# Patient Record
Sex: FEMALE | Race: OTHER | Employment: UNEMPLOYED | URBAN - METROPOLITAN AREA
[De-identification: names, ages, dates, MRNs, and addresses within clinical notes are randomized per-mention and may not be internally consistent; named-entity substitution may affect disease eponyms.]

---

## 2017-08-18 ENCOUNTER — HOSPITAL ENCOUNTER (EMERGENCY)
Age: 32
Discharge: HOME | End: 2017-08-18
Attending: EMERGENCY MEDICINE
Payer: SELF-PAY

## 2017-08-18 ENCOUNTER — APPOINTMENT (OUTPATIENT)
Dept: ULTRASOUND IMAGING | Age: 32
End: 2017-08-18
Attending: EMERGENCY MEDICINE
Payer: SELF-PAY

## 2017-08-18 VITALS
HEART RATE: 76 BPM | SYSTOLIC BLOOD PRESSURE: 109 MMHG | DIASTOLIC BLOOD PRESSURE: 76 MMHG | OXYGEN SATURATION: 100 % | BODY MASS INDEX: 20.24 KG/M2 | RESPIRATION RATE: 12 BRPM | HEIGHT: 62 IN | TEMPERATURE: 98.4 F | WEIGHT: 110 LBS

## 2017-08-18 DIAGNOSIS — O20.0 THREATENED MISCARRIAGE IN EARLY PREGNANCY: Primary | ICD-10-CM

## 2017-08-18 LAB
ABO + RH BLD: NORMAL
ANION GAP SERPL CALC-SCNC: 7 MMOL/L (ref 3–18)
APPEARANCE UR: CLEAR
BACTERIA URNS QL MICRO: ABNORMAL /HPF
BASOPHILS # BLD: 0 K/UL (ref 0–0.06)
BASOPHILS NFR BLD: 0 % (ref 0–2)
BILIRUB UR QL: NEGATIVE
BUN SERPL-MCNC: 17 MG/DL (ref 7–18)
BUN/CREAT SERPL: 27 (ref 12–20)
CALCIUM SERPL-MCNC: 8.9 MG/DL (ref 8.5–10.1)
CHLORIDE SERPL-SCNC: 103 MMOL/L (ref 100–108)
CO2 SERPL-SCNC: 28 MMOL/L (ref 21–32)
COLOR UR: YELLOW
CREAT SERPL-MCNC: 0.63 MG/DL (ref 0.6–1.3)
DIFFERENTIAL METHOD BLD: ABNORMAL
EOSINOPHIL # BLD: 0 K/UL (ref 0–0.4)
EOSINOPHIL NFR BLD: 0 % (ref 0–5)
EPITH CASTS URNS QL MICRO: ABNORMAL /LPF (ref 0–5)
ERYTHROCYTE [DISTWIDTH] IN BLOOD BY AUTOMATED COUNT: 12.1 % (ref 11.6–14.5)
GLUCOSE SERPL-MCNC: 93 MG/DL (ref 74–99)
GLUCOSE UR STRIP.AUTO-MCNC: NEGATIVE MG/DL
HCG SERPL-ACNC: 402 MIU/ML (ref 0–10)
HCG UR QL: POSITIVE
HCT VFR BLD AUTO: 36.3 % (ref 35–45)
HGB BLD-MCNC: 12.5 G/DL (ref 12–16)
HGB UR QL STRIP: ABNORMAL
KETONES UR QL STRIP.AUTO: NEGATIVE MG/DL
LEUKOCYTE ESTERASE UR QL STRIP.AUTO: ABNORMAL
LYMPHOCYTES # BLD: 1.6 K/UL (ref 0.9–3.6)
LYMPHOCYTES NFR BLD: 27 % (ref 21–52)
MCH RBC QN AUTO: 28.3 PG (ref 24–34)
MCHC RBC AUTO-ENTMCNC: 34.4 G/DL (ref 31–37)
MCV RBC AUTO: 82.3 FL (ref 74–97)
MONOCYTES # BLD: 0.8 K/UL (ref 0.05–1.2)
MONOCYTES NFR BLD: 14 % (ref 3–10)
NEUTS SEG # BLD: 3.4 K/UL (ref 1.8–8)
NEUTS SEG NFR BLD: 59 % (ref 40–73)
NITRITE UR QL STRIP.AUTO: NEGATIVE
PH UR STRIP: 7.5 [PH] (ref 5–8)
PLATELET # BLD AUTO: 314 K/UL (ref 135–420)
PMV BLD AUTO: 9 FL (ref 9.2–11.8)
POTASSIUM SERPL-SCNC: 3.9 MMOL/L (ref 3.5–5.5)
PROT UR STRIP-MCNC: NEGATIVE MG/DL
RBC # BLD AUTO: 4.41 M/UL (ref 4.2–5.3)
RBC #/AREA URNS HPF: ABNORMAL /HPF (ref 0–5)
SERVICE CMNT-IMP: NORMAL
SODIUM SERPL-SCNC: 138 MMOL/L (ref 136–145)
SP GR UR REFRACTOMETRY: 1.01 (ref 1–1.03)
UROBILINOGEN UR QL STRIP.AUTO: 0.2 EU/DL (ref 0.2–1)
WBC # BLD AUTO: 5.8 K/UL (ref 4.6–13.2)
WBC URNS QL MICRO: ABNORMAL /HPF (ref 0–4)
WET PREP GENITAL: NORMAL

## 2017-08-18 PROCEDURE — 86900 BLOOD TYPING SEROLOGIC ABO: CPT | Performed by: EMERGENCY MEDICINE

## 2017-08-18 PROCEDURE — 76817 TRANSVAGINAL US OBSTETRIC: CPT

## 2017-08-18 PROCEDURE — 81001 URINALYSIS AUTO W/SCOPE: CPT

## 2017-08-18 PROCEDURE — 99284 EMERGENCY DEPT VISIT MOD MDM: CPT

## 2017-08-18 PROCEDURE — 80048 BASIC METABOLIC PNL TOTAL CA: CPT | Performed by: EMERGENCY MEDICINE

## 2017-08-18 PROCEDURE — 81025 URINE PREGNANCY TEST: CPT

## 2017-08-18 PROCEDURE — 87491 CHLMYD TRACH DNA AMP PROBE: CPT

## 2017-08-18 PROCEDURE — 85025 COMPLETE CBC W/AUTO DIFF WBC: CPT | Performed by: EMERGENCY MEDICINE

## 2017-08-18 PROCEDURE — 87210 SMEAR WET MOUNT SALINE/INK: CPT

## 2017-08-18 PROCEDURE — 84702 CHORIONIC GONADOTROPIN TEST: CPT | Performed by: EMERGENCY MEDICINE

## 2017-08-18 NOTE — ED TRIAGE NOTES
Provider in triage: 28 y o f 6 wks preg has vaginal bleeding for four days with lower abd pain  Ordered: labs, t&s Patria  DDx: threatened ab, ectopic  Sent to (MAIN treatment area, FAST track): fast

## 2017-08-18 NOTE — ED PROVIDER NOTES
HPI Comments: Patient is a 27 y/o female , who presents to the ER c/o vaginal spotting. Patient states her symptoms began 2-3 days ago. Patient states she is approx. 6 weeks pregnant with confirmed home pregnancy test.  Her LMP was . She has been taking OTC prenatals. She does not have any OBGYN follow up. She denied any fevers, chills, chest pain, SOB, abdominal pain, dysuria, and has no other complaints. Pt denied any concern for STD exposure at this time. Patient is a 28 y.o. female presenting with vaginal bleeding. The history is provided by the patient. Vaginal Bleeding   Pertinent negatives include no chest pain, no abdominal pain, no headaches and no shortness of breath. No past medical history on file. No past surgical history on file. No family history on file. Social History     Social History    Marital status: SINGLE     Spouse name: N/A    Number of children: N/A    Years of education: N/A     Occupational History    Not on file. Social History Main Topics    Smoking status: Not on file    Smokeless tobacco: Not on file    Alcohol use Not on file    Drug use: Not on file    Sexual activity: Not on file     Other Topics Concern    Not on file     Social History Narrative         ALLERGIES: Review of patient's allergies indicates no known allergies. Review of Systems   Constitutional: Negative for chills, fatigue and fever. HENT: Negative. Negative for sore throat. Eyes: Negative. Respiratory: Negative for cough and shortness of breath. Cardiovascular: Negative for chest pain and palpitations. Gastrointestinal: Negative for abdominal pain, nausea and vomiting. Genitourinary: Positive for vaginal bleeding. Negative for dysuria. Musculoskeletal: Negative. Skin: Negative. Neurological: Negative for dizziness, weakness, light-headedness and headaches. Psychiatric/Behavioral: Negative.     All other systems reviewed and are negative. Vitals:    17 1715   BP: 109/76   Pulse: 76   Resp: 12   Temp: 98.4 °F (36.9 °C)   SpO2: 100%   Weight: 49.9 kg (110 lb)   Height: 5' 2\" (1.575 m)            Physical Exam   Constitutional: She is oriented to person, place, and time. She appears well-developed and well-nourished. No distress. HENT:   Head: Normocephalic and atraumatic. Mouth/Throat: Oropharynx is clear and moist.   Eyes: Conjunctivae are normal. No scleral icterus. Neck: Normal range of motion. Neck supple. No JVD present. No tracheal deviation present. Cardiovascular: Normal rate, regular rhythm and normal heart sounds. Pulmonary/Chest: Effort normal and breath sounds normal. No respiratory distress. She has no wheezes. Abdominal: Soft. Bowel sounds are normal. There is no tenderness. Musculoskeletal: Normal range of motion. Neurological: She is alert and oriented to person, place, and time. She has normal strength. Gait normal. GCS eye subscore is 4. GCS verbal subscore is 5. GCS motor subscore is 6. Skin: Skin is warm and dry. She is not diaphoretic. Psychiatric: She has a normal mood and affect. Nursing note and vitals reviewed. MDM  Number of Diagnoses or Management Options  Threatened miscarriage in early pregnancy:   Diagnosis management comments: 5:50 PM  27 y/o female , approx. 6 weeks pregnant c/o vaginal spotting onset several days ago. Does not have OBGYN follow up. Is already taking prenatals at this time. Will plan on labs, UA, and TVUS. Bekah Frey PA-C    8:51 PM  Awaiting wet prep results. TVUS results :  Pregnancy of unknown location; no IUP, no separate adnexal masses. Findings possible due to early pregnancy. Advised f/u US and Beta HCG until definitive diagnosis. Bekah Frey PA-C    9:29 PM  Wet prep negative. Discussed US results with pt and boyfriend in room. Advised to return in 3-5 days for follow up repeat beta HCG to trend.   Will also give OBGYN info for follow up. All questions answered and patient in agreement with plan of care. Will plan for discharge. Mendel Hemp, PA-C      Clinical Impression:  Positive  Pregnancy, vaginal bleeding       Amount and/or Complexity of Data Reviewed  Clinical lab tests: ordered and reviewed  Tests in the radiology section of CPT®: ordered and reviewed    Risk of Complications, Morbidity, and/or Mortality  Presenting problems: moderate  Diagnostic procedures: moderate  Management options: moderate    Patient Progress  Patient progress: stable    ED Course       Pelvic Exam  Date/Time: 8/18/2017 8:49 PM  Performed by: PA  Procedure duration:  2 minutes. Documented by:  katarzyna weiner. As dictated by:  same  Exam assisted by:  Lamar Parham RN. Type of exam performed: bimanual and speculum. External genitalia appearance: normal.    Vaginal exam:  bleeding. Bleeding: mild  Cervical exam:  os closed and no cervical motion tenderness. Specimen(s) collected:  chlamydia, GC and vaginal culture.   Bimanual exam:  normal.    Patient tolerance: Patient tolerated the procedure well with no immediate complications                 Vitals:  Patient Vitals for the past 12 hrs:   Temp Pulse Resp BP SpO2   08/18/17 1715 98.4 °F (36.9 °C) 76 12 109/76 100 %         Medications ordered:   Medications - No data to display      Lab findings:  Recent Results (from the past 12 hour(s))   URINALYSIS W/ RFLX MICROSCOPIC    Collection Time: 08/18/17  5:37 PM   Result Value Ref Range    Color YELLOW      Appearance CLEAR      Specific gravity 1.015 1.005 - 1.030      pH (UA) 7.5 5.0 - 8.0      Protein NEGATIVE  NEG mg/dL    Glucose NEGATIVE  NEG mg/dL    Ketone NEGATIVE  NEG mg/dL    Bilirubin NEGATIVE  NEG      Blood MODERATE (A) NEG      Urobilinogen 0.2 0.2 - 1.0 EU/dL    Nitrites NEGATIVE  NEG      Leukocyte Esterase TRACE (A) NEG     URINE MICROSCOPIC ONLY    Collection Time: 08/18/17  5:37 PM   Result Value Ref Range    WBC 2 to 3 0 - 4 /hpf    RBC 4 to 8 0 - 5 /hpf    Epithelial cells 2+ 0 - 5 /lpf    Bacteria FEW (A) NEG /hpf   CBC WITH AUTOMATED DIFF    Collection Time: 08/18/17  6:09 PM   Result Value Ref Range    WBC 5.8 4.6 - 13.2 K/uL    RBC 4.41 4.20 - 5.30 M/uL    HGB 12.5 12.0 - 16.0 g/dL    HCT 36.3 35.0 - 45.0 %    MCV 82.3 74.0 - 97.0 FL    MCH 28.3 24.0 - 34.0 PG    MCHC 34.4 31.0 - 37.0 g/dL    RDW 12.1 11.6 - 14.5 %    PLATELET 475 867 - 534 K/uL    MPV 9.0 (L) 9.2 - 11.8 FL    NEUTROPHILS 59 40 - 73 %    LYMPHOCYTES 27 21 - 52 %    MONOCYTES 14 (H) 3 - 10 %    EOSINOPHILS 0 0 - 5 %    BASOPHILS 0 0 - 2 %    ABS. NEUTROPHILS 3.4 1.8 - 8.0 K/UL    ABS. LYMPHOCYTES 1.6 0.9 - 3.6 K/UL    ABS. MONOCYTES 0.8 0.05 - 1.2 K/UL    ABS. EOSINOPHILS 0.0 0.0 - 0.4 K/UL    ABS.  BASOPHILS 0.0 0.0 - 0.06 K/UL    DF AUTOMATED     METABOLIC PANEL, BASIC    Collection Time: 08/18/17  6:09 PM   Result Value Ref Range    Sodium 138 136 - 145 mmol/L    Potassium 3.9 3.5 - 5.5 mmol/L    Chloride 103 100 - 108 mmol/L    CO2 28 21 - 32 mmol/L    Anion gap 7 3.0 - 18 mmol/L    Glucose 93 74 - 99 mg/dL    BUN 17 7.0 - 18 MG/DL    Creatinine 0.63 0.6 - 1.3 MG/DL    BUN/Creatinine ratio 27 (H) 12 - 20      GFR est AA >60 >60 ml/min/1.73m2    GFR est non-AA >60 >60 ml/min/1.73m2    Calcium 8.9 8.5 - 10.1 MG/DL   TYPE, ABO & RH    Collection Time: 08/18/17  6:09 PM   Result Value Ref Range    ABO/Rh(D) O POSITIVE    BETA HCG, QT    Collection Time: 08/18/17  6:09 PM   Result Value Ref Range    Beta HCG,  (H) 0 - 10 MIU/ML   HCG URINE, QL. - POC    Collection Time: 08/18/17  6:16 PM   Result Value Ref Range    Pregnancy test,urine (POC) POSITIVE (A) NEG     WET PREP    Collection Time: 08/18/17  8:46 PM   Result Value Ref Range    Special Requests: NO SPECIAL REQUESTS      Wet prep NO YEAST,TRICHOMONAS OR CLUE CELLS NOTED         EKG interpretation by ED Physician:      X-Ray, CT or other radiology findings or impressions:  US UTS TRANSVAGINAL OB (Results Pending)       Progress notes, Consult notes or additional Procedure notes:       Reevaluation of patient:       Disposition:  Diagnosis:   1.  Threatened miscarriage in early pregnancy        Disposition: Discharged    Follow-up Information     Follow up With Details Comments Contact Info    [facility] EMERGENCY DEPT  If symptoms worsen [address]  [phone]    [facility] EMERGENCY DEPT In 1 week for follow up of labs and ultrasound as needed  [address] [address] Schedule an appointment as soon as possible for a visit in 3 days follow up for OBGYN as needed [address]  [phone]           Patient's Medications    No medications on file           Vitals:  Patient Vitals for the past 12 hrs:   Temp Pulse Resp BP SpO2   08/18/17 1715 98.4 °F (36.9 °C) 76 12 109/76 100 %         Medications ordered:   Medications - No data to display      Lab findings:  Recent Results (from the past 12 hour(s))   URINALYSIS W/ RFLX MICROSCOPIC    Collection Time: 08/18/17  5:37 PM   Result Value Ref Range    Color YELLOW      Appearance CLEAR      Specific gravity 1.015 1.005 - 1.030      pH (UA) 7.5 5.0 - 8.0      Protein NEGATIVE  NEG mg/dL    Glucose NEGATIVE  NEG mg/dL    Ketone NEGATIVE  NEG mg/dL    Bilirubin NEGATIVE  NEG      Blood MODERATE (A) NEG      Urobilinogen 0.2 0.2 - 1.0 EU/dL    Nitrites NEGATIVE  NEG      Leukocyte Esterase TRACE (A) NEG     URINE MICROSCOPIC ONLY    Collection Time: 08/18/17  5:37 PM   Result Value Ref Range    WBC 2 to 3 0 - 4 /hpf    RBC 4 to 8 0 - 5 /hpf    Epithelial cells 2+ 0 - 5 /lpf    Bacteria FEW (A) NEG /hpf   CBC WITH AUTOMATED DIFF    Collection Time: 08/18/17  6:09 PM   Result Value Ref Range    WBC 5.8 4.6 - 13.2 K/uL    RBC 4.41 4.20 - 5.30 M/uL    HGB 12.5 12.0 - 16.0 g/dL    HCT 36.3 35.0 - 45.0 %    MCV 82.3 74.0 - 97.0 FL    MCH 28.3 24.0 - 34.0 PG MCHC 34.4 31.0 - 37.0 g/dL    RDW 12.1 11.6 - 14.5 %    PLATELET 312 070 - 342 K/uL    MPV 9.0 (L) 9.2 - 11.8 FL    NEUTROPHILS 59 40 - 73 %    LYMPHOCYTES 27 21 - 52 %    MONOCYTES 14 (H) 3 - 10 %    EOSINOPHILS 0 0 - 5 %    BASOPHILS 0 0 - 2 %    ABS. NEUTROPHILS 3.4 1.8 - 8.0 K/UL    ABS. LYMPHOCYTES 1.6 0.9 - 3.6 K/UL    ABS. MONOCYTES 0.8 0.05 - 1.2 K/UL    ABS. EOSINOPHILS 0.0 0.0 - 0.4 K/UL    ABS. BASOPHILS 0.0 0.0 - 0.06 K/UL    DF AUTOMATED     METABOLIC PANEL, BASIC    Collection Time: 08/18/17  6:09 PM   Result Value Ref Range    Sodium 138 136 - 145 mmol/L    Potassium 3.9 3.5 - 5.5 mmol/L    Chloride 103 100 - 108 mmol/L    CO2 28 21 - 32 mmol/L    Anion gap 7 3.0 - 18 mmol/L    Glucose 93 74 - 99 mg/dL    BUN 17 7.0 - 18 MG/DL    Creatinine 0.63 0.6 - 1.3 MG/DL    BUN/Creatinine ratio 27 (H) 12 - 20      GFR est AA >60 >60 ml/min/1.73m2    GFR est non-AA >60 >60 ml/min/1.73m2    Calcium 8.9 8.5 - 10.1 MG/DL   TYPE, ABO & RH    Collection Time: 08/18/17  6:09 PM   Result Value Ref Range    ABO/Rh(D) O POSITIVE    BETA HCG, QT    Collection Time: 08/18/17  6:09 PM   Result Value Ref Range    Beta HCG,  (H) 0 - 10 MIU/ML   HCG URINE, QL. - POC    Collection Time: 08/18/17  6:16 PM   Result Value Ref Range    Pregnancy test,urine (POC) POSITIVE (A) NEG     WET PREP    Collection Time: 08/18/17  8:46 PM   Result Value Ref Range    Special Requests: NO SPECIAL REQUESTS      Wet prep NO YEAST,TRICHOMONAS OR CLUE CELLS NOTED         EKG interpretation by ED Physician:      X-Ray, CT or other radiology findings or impressions:  US UTS TRANSVAGINAL OB    (Results Pending)       Progress notes, Consult notes or additional Procedure notes:       Reevaluation of patient:       Disposition:  Diagnosis:   1.  Threatened miscarriage in early pregnancy        Disposition: Discharged    Follow-up Information     Follow up With Details Comments Contact Info    [facility] EMERGENCY DEPT  If symptoms worsen [address]    [address] EMERGENCY DEPT In 1 week for follow up of labs and ultrasound as needed  [address] [address] [address]  Schedule an appointment as soon as possible for a visit in 3 days follow up for OBGYN as needed [address]  [phone]           Patient's Medications    No medications on file

## 2017-08-19 NOTE — ED NOTES
Pt discharged to home ambulatory and in company of s/o  Discharge instructions provided via discussion and handout. Teaching to patient   Verbalized understanding. No questions voiced. Discharged with 0 RX.

## 2017-08-19 NOTE — DISCHARGE INSTRUCTIONS
Amenaza de aborto espontáneo: Instrucciones de cuidado - [ Threatened Miscarriage: Care Instructions ]  Instrucciones de cuidado    Algunas mujeres tienen manchado o sangrado vaginal leves luda las primeras 12 semanas del embarazo. En algunos casos, esto es normal. El manchado o sangrado vaginal leve también puede ser kristel señal de [place] posible pérdida del embarazo. [place] se conoce sherif amenaza de aborto espontáneo. En [facility], quizás el médico no pueda decirle si zacarias sangrado vaginal es normal o es kristel señal de un aborto espontáneo. Al principio del embarazo, cosas sherif el estrés, el ejercicio y las relaciones sexuales no provocan aborto espontáneo. Raynette Muscat por la posibilidad de perder el embarazo. Nicky no se eche la culpa. No existe un tratamiento para detener kristel amenaza de aborto espontáneo. Si de hecho tiene un aborto espontáneo, no hay nada que pudiera linh hecho para prevenirlo. Un aborto espontáneo suele significar que el embarazo no está progresando normalmente. El médico la montelongo examinado minuciosamente, nicky pueden desarrollarse problemas más tarde. Si nota algún problema o nuevos síntomas, busque tratamiento médico de inmediato. La atención de seguimiento es kristel parte clave de zacarias tratamiento y seguridad. Asegúrese de hacer y acudir a todas las citas, y llame a zacarias médico si está teniendo problemas. También es kristel buena idea saber los resultados de los exámenes y mantener kristel lista de los medicamentos que anabell. ¿Cómo puede cuidarse en el hogar? · Si de hecho tiene un aborto espontáneo, quizás experimente algo de sangrado vaginal por 1 o 2 semanas. Use toallas sanitarias en lugar de tampones. · Bowlus acetaminofén (Tylenol) para los cólicos. Leila y siga todas las instrucciones de la [place]. · No tome dos o más analgésicos (medicamentos para el dolor) al [employer] tiempo a menos que el médico se lo haya indicado. Muchos analgésicos contienen acetaminofén, lo cual es Tylenol. El exceso de acetaminofén (Tylenol) puede ser dañino. · No tenga relaciones sexuales hasta que cummings médico lo apruebe. · Descanse mucho luda los melissa siguientes. · Puede hacer merry actividades habituales si se siente lo suficientemente karl para hacerlas. Nicky no rao ejercicio arduo hasta que cummings médico lo apruebe. · Siga kristel dieta equilibrada que sea vasiliy en robert y vitamina C. Los alimentos ricos en robert incluyen las kevin pagan, los [place], los [place], los frijoles y las verduras de hojas verdes. Los alimentos con alto contenido de vitamina C [place] cítricos, los tomates y el brócoli. Hable con cummings médico sobre si usted debe kwabena pastillas de robert o un multivitamínico.  · No makenna alcohol, ni use tabaco o drogas ilegales. · No fume. Si necesita ayuda para dejar de fumar, hable con cummings médico sobre programas y medicamentos para dejar de fumar. Estos pueden aumentar merry probabilidades de dejar el hábito para siempre. ¿Cuándo debe pedir ayuda? Llame al 911 en cualquier momento que crea que puede necesitar atención de urgencias vitales. Por ejemplo, llame si:  · Tiene dolor repentino e intenso en el vientre o la pelvis. · Se desmayó (perdió el conocimiento). · Tiene sangrado vaginal intenso. Llame a cummings médico ahora mismo o busque atención médica inmediata si:  · Se siente mareada o aturdida, o siente que está a punto de [place]. · Tiene nuevo o mayor dolor en el vientre o la pelvis. · Cummings sangrado vaginal está empeorando. · Tiene dolor que ha aumentado en la gentry vaginal.  · Tiene fiebre. · Funmi que puede linh expulsado tejido. Conserve todo el tejido que expulse. Llévelo al consultorio del médico tan pronto sherif pueda. Vigile de cerca los cambios en cummings hailey y asegúrese de comunicarse con cummings médico si:  · Tiene secreción vaginal nueva o peor. · No mejora sherif se esperaba. ¿Dónde puede encontrar más información en inglés? Mauricio Rodriguez a http://georges-maia.info/.   Daniele Garcia [id number] en la búsqueda para aprender más acerca de \"Amenaza de aborto espontáneo: Instrucciones de cuidado - [ Threatened Miscarriage: Care Instructions ]. \"  Revisado: 16 marzo, 2017  Versión del contenido: 11.3  © 6832-3852 Healthwise, Incorporated. Las instrucciones de cuidado fueron adaptadas bajo licencia por [employer] (which disclaims liability or warranty for this information). Si usted tiene [place] afección médica o sobre estas instrucciones, siempre pregunte a zacarias profesional de hailey. Healthwise, Incorporated niega toda garantía o responsabilidad por zacarias uso de esta información.

## 2017-08-21 LAB
C TRACH RRNA SPEC QL NAA+PROBE: NEGATIVE
N GONORRHOEA RRNA SPEC QL NAA+PROBE: NEGATIVE
SPECIMEN SOURCE: NORMAL

## 2017-08-24 ENCOUNTER — HOSPITAL ENCOUNTER (EMERGENCY)
Age: 32
Discharge: HOME | End: 2017-08-24
Attending: EMERGENCY MEDICINE | Admitting: EMERGENCY MEDICINE
Payer: SELF-PAY

## 2017-08-24 VITALS
OXYGEN SATURATION: 100 % | WEIGHT: 110 LBS | SYSTOLIC BLOOD PRESSURE: 97 MMHG | TEMPERATURE: 98 F | HEIGHT: 61 IN | RESPIRATION RATE: 16 BRPM | HEART RATE: 73 BPM | DIASTOLIC BLOOD PRESSURE: 63 MMHG | BODY MASS INDEX: 20.77 KG/M2

## 2017-08-24 DIAGNOSIS — O20.0 THREATENED MISCARRIAGE: Primary | ICD-10-CM

## 2017-08-24 LAB — HCG SERPL-ACNC: 349 MIU/ML (ref 0–10)

## 2017-08-24 PROCEDURE — 84702 CHORIONIC GONADOTROPIN TEST: CPT | Performed by: PHYSICIAN ASSISTANT

## 2017-08-24 PROCEDURE — 99282 EMERGENCY DEPT VISIT SF MDM: CPT

## 2017-08-24 NOTE — ED TRIAGE NOTES
Seen a week ago for same. 7 weeks pregnant with vaginal bleeding. Denies cramping.  Unable to get OB appointment til next month

## 2017-08-24 NOTE — ED PROVIDER NOTES
Patient is a 28 y.o. female presenting with pregnancy problem and vaginal bleeding. Pregnancy Problem    Pertinent negatives include no fever, no diarrhea, no nausea, no vomiting and no chest pain. Vaginal Bleeding   Pertinent negatives include no chest pain and no abdominal pain. Petra Willis is a 28 y.o. female  presents with vaginal bleeding intermittent since she was seen last time on the  of this month and underwent TVUS which was read as      \"Pregnancy of unknown location. Recommend close follow-up until definitive  diagnosis can be established. \"  Currently denies of any abdominal pain, fever, chills, urinary sx or diarrhea. Social History     Social History    Marital status: SINGLE     Spouse name: N/A    Number of children: N/A    Years of education: N/A     Occupational History    Not on file. Social History Main Topics    Smoking status: Never Smoker    Smokeless tobacco: Never Used    Alcohol use Not on file    Drug use: Not on file    Sexual activity: Not on file     Other Topics Concern    Not on file     Social History Narrative    No narrative on file         ALLERGIES: Review of patient's allergies indicates no known allergies. Review of Systems   Constitutional: Negative for chills, fatigue and fever. HENT: Negative. Eyes: Negative. Respiratory: Negative. Cardiovascular: Negative for chest pain and palpitations. Gastrointestinal: Negative for abdominal pain, diarrhea, nausea and vomiting. Endocrine: Negative. Genitourinary: Positive for vaginal bleeding. Musculoskeletal: Negative. Allergic/Immunologic: Negative. Neurological: Negative. Hematological: Negative. Psychiatric/Behavioral: Negative.         Vitals:    17 1827   BP: 110/70   Pulse: 68   Resp: 16   Temp: 98 °F (36.7 °C)   SpO2: 100%   Weight: 49.9 kg (110 lb)   Height: 5' 1\" (1.549 m)            Physical Exam   Constitutional: She is oriented to person, place, and time. She appears well-developed and well-nourished. No distress. HENT:   Head: Normocephalic and atraumatic. Eyes: Conjunctivae and EOM are normal. Pupils are equal, round, and reactive to light. Neck: Normal range of motion. Cardiovascular: Normal rate, regular rhythm and normal heart sounds. Pulmonary/Chest: Effort normal and breath sounds normal. No respiratory distress. She has no wheezes. She has no rales. She exhibits no tenderness. Abdominal: Soft. Bowel sounds are normal. She exhibits no distension. There is no tenderness. There is no rebound and no guarding. Neurological: She is alert and oriented to person, place, and time. Skin: Skin is warm. She is not diaphoretic. Psychiatric: She has a normal mood and affect. Her behavior is normal. Judgment and thought content normal.        MDM  Number of Diagnoses or Management Options  Threatened miscarriage:   Diagnosis management comments: Discussed case with Dr Kashif Lamb, who requested to get Beta HCG and urine in the setting of no abdominal pain. Results for Carmine Jorgensen (MRN [id number]) as of 8/24/2017 21:02    8/18/2017 18:09  Beta HCG, QT: 402 (H)    8/24/2017 19:18  Beta HCG, QT: 349 (H    9:20 PM paged Gyn to discuss the case. Updated family on labs and requested to follow up with Gyn.     10:18 PM Spoke to Dr Holland and updated on beta HCG. Recommendation to follow up with Gyn as OP . Jeremyna Him Will discharge. ED Course       Procedures    Recent Results (from the past 12 hour(s))   BETA HCG, QT    Collection Time: 08/24/17  7:18 PM   Result Value Ref Range    Beta HCG,  (H) 0 - 10 MIU/ML         DISCHARGE NOTE:  10:18 PM      Isaura Wetzel's  results have been reviewed with her. She has been counseled regarding her diagnosis, treatment, and plan.   She verbally conveys understanding and agreement of the signs, symptoms, diagnosis, treatment and prognosis and additionally agrees to follow up as discussed. She also agrees with the care-plan and conveys that all of her questions have been answered. I have also provided discharge instructions for her that include: educational information regarding their diagnosis and treatment, and list of reasons why they would want to return to the ED prior to their follow-up appointment, should her condition change. CLINICAL IMPRESSION:    1. Threatened miscarriage        AFTER VISIT PLAN:    There are no discharge medications for this patient.        Follow-up Information     Follow up With Details Comments Javier Jain  Follow up with your PCP [address]  [address] [facility] EM[facility]GENCY DEPT  If symptoms worsen [address]  [phone]    Gynecology    please follow up with your gynecology as scheduled            Written by Katie Bishop PA-C

## 2017-08-24 NOTE — ED NOTES
Patient states she was seen here last week for vaginal bleeding during pregnancy and is still having it. Patient denies any pain, clots or changes. Patient states she is unable to get an OB appointment until next month.

## 2017-08-25 NOTE — ED NOTES
I have reviewed discharge instructions with patient and . Patient verbalized understanding and has no further questions at this time. Education taught and patient verbalized understanding of education. Teach back method used. IV removed, catheter tip intact on removal.   Patients pain 0/10. Belongings given to patient. Patient discharged with family to home.

## 2017-08-25 NOTE — DISCHARGE INSTRUCTIONS
Amenaza de aborto espontáneo: Instrucciones de cuidado - [ Threatened Miscarriage: Care Instructions ]  Instrucciones de cuidado    Algunas mujeres tienen manchado o sangrado vaginal leves luda las primeras 12 semanas del embarazo. En algunos casos, esto es normal. El manchado o sangrado vaginal leve también puede ser kristel señal de [place] posible pérdida del embarazo. [place] se conoce sherif amenaza de aborto espontáneo. En [facility], quizás el médico no pueda decirle si zacarias sangrado vaginal es normal o es kristel señal de un aborto espontáneo. Al principio del embarazo, cosas sherif el estrés, el ejercicio y las relaciones sexuales no provocan aborto espontáneo. Deretha Meager por la posibilidad de perder el embarazo. Nicky no se eche la culpa. No existe un tratamiento para detener kristel amenaza de aborto espontáneo. Si de hecho tiene un aborto espontáneo, no hay nada que pudiera linh hecho para prevenirlo. Un aborto espontáneo suele significar que el embarazo no está progresando normalmente. El médico la montelongo examinado minuciosamente, nicky pueden desarrollarse problemas más tarde. Si nota algún problema o nuevos síntomas, busque tratamiento médico de inmediato. La atención de seguimiento es kristel parte clave de zacarias tratamiento y seguridad. Asegúrese de hacer y acudir a todas las citas, y llame a zacarias médico si está teniendo problemas. También es kristel buena idea saber los resultados de los exámenes y mantener kristel lista de los medicamentos que anabell. ¿Cómo puede cuidarse en el hogar? · Si de hecho tiene un aborto espontáneo, quizás experimente algo de sangrado vaginal por 1 o 2 semanas. Use toallas sanitarias en lugar de tampones. · [place] acetaminofén (Tylenol) para los cólicos. Leila y siga todas las instrucciones de la [place]. · No tome dos o más analgésicos (medicamentos para el dolor) al [employer] tiempo a menos que el médico se lo haya indicado. Muchos analgésicos contienen acetaminofén, lo cual es Tylenol. El exceso de acetaminofén (Tylenol) puede ser dañino. · No tenga relaciones sexuales hasta que cummings médico lo apruebe. · Descanse mucho luda los melissa siguientes. · Puede hacer merry actividades habituales si se siente lo suficientemente karl para hacerlas. Nicky no rao ejercicio arduo hasta que cummings médico lo apruebe. · Siga kristel dieta equilibrada que sea vasiliy en robert y vitamina C. Los alimentos ricos en robert incluyen las kevin pagan, los [place], los [place], los frijoles y las verduras de hojas verdes. Los alimentos con alto contenido de vitamina C [place] cítricos, los tomates y el brócoli. Hable con cummings médico sobre si usted debe kwabena pastillas de robert o un multivitamínico.  · No makenna alcohol, ni use tabaco o drogas ilegales. · No fume. Si necesita ayuda para dejar de fumar, hable con cummings médico sobre programas y medicamentos para dejar de fumar. Estos pueden aumentar merry probabilidades de dejar el hábito para siempre. ¿Cuándo debe pedir ayuda? Llame al 911 en cualquier momento que crea que puede necesitar atención de urgencias vitales. Por ejemplo, llame si:  · Tiene dolor repentino e intenso en el vientre o la pelvis. · Se desmayó (perdió el conocimiento). · Tiene sangrado vaginal intenso. Llame a cummings médico ahora mismo o busque atención médica inmediata si:  · Se siente mareada o aturdida, o siente que está a punto de [place]. · Tiene nuevo o mayor dolor en el vientre o la pelvis. · Cummings sangrado vaginal está empeorando. · Tiene dolor que ha aumentado en la gentry vaginal.  · Tiene fiebre. · Funmi que puede linh expulsado tejido. Conserve todo el tejido que expulse. Llévelo al consultorio del médico tan pronto sehrif pueda. Vigile de cerca los cambios en cummings hailey y asegúrese de comunicarse con cummings médico si:  · Tiene secreción vaginal nueva o peor. · No mejora sherif se esperaba. ¿Dónde puede encontrar más información en inglés? Maria D Armenta a [email or web address]   Leonora Barajas [id number] en la búsqueda para aprender más acerca de \"Amenaza de aborto espontáneo: Instrucciones de cuidado - [ Threatened Miscarriage: Care Instructions ]. \"  Revisado: 2017  Versión del contenido: 11.3  © 4002-6071 Healthwise, Incorporated. Las instrucciones de cuidado fueron adaptadas bajo licencia por [employer] (which disclaims liability or warranty for this information). Si usted tiene [place] afección médica o sobre estas instrucciones, siempre pregunte a zacarias profesional de hailey. Healthwise, Incorporated niega toda garantía o responsabilidad por zacarias uso de esta información. [facility] Activation    Thank you for requesting access to [facility]. Please follow the instructions below to securely access and download your online medical record. [facility] allows you to send messages to your doctor, view your test results, renew your prescriptions, schedule appointments, and more. How Do I Sign Up? 1. In your internet browser, go to [email or web address]  2. Click on the First Time User? Click Here link in the Sign In box. You will be redirect to the New Member Sign Up page. 3. Enter your [facility] Access Code exactly as it appears below. You will not need to use this code after youve completed the sign-up process. If you do not sign up before the expiration date, you must request a new code. [facility] Access Code: [email or web address]  Expires: 2017  9:29 PM (This is the date your [facility] access code will )    4. Enter the last four digits of your Social Security Number (xxxx) and Date of Birth (mm/dd/yyyy) as indicated and click Submit. You will be taken to the next sign-up page. 5. Create a [facility] ID. This will be your [facility] login ID and cannot be changed, so think of one that is secure and easy to remember. 6. Create a [facility] password. You can change your password at any time. 7. Enter your Password Reset Question and Answer.  This can be used at a later time if you forget your password. 8. Enter your e-mail address. You will receive e-mail notification when new information is available in [address] 9. Click Sign Up. You can now view and download portions of your medical record. 10. Click the Download Summary menu link to download a portable copy of your medical information. Additional Information    If you have questions, please visit the Frequently Asked Questions section of the united healthcare practice solutions website at [email or web address] [employer]/. Remember, united [employer] practice solutions is NOT to be used for urgent needs. For medical emergencies, dial 911.

## 2017-08-30 ENCOUNTER — OFFICE VISIT (OUTPATIENT)
Dept: OBGYN CLINIC | Age: 32
End: 2017-08-30

## 2017-08-30 ENCOUNTER — HOSPITAL ENCOUNTER (OUTPATIENT)
Dept: LAB | Age: 32
Discharge: HOME | End: 2017-08-30
Payer: SELF-PAY

## 2017-08-30 VITALS — DIASTOLIC BLOOD PRESSURE: 74 MMHG | SYSTOLIC BLOOD PRESSURE: 110 MMHG | WEIGHT: 108 LBS | HEART RATE: 74 BPM

## 2017-08-30 LAB — HCG SERPL-ACNC: 341 MIU/ML (ref 0–10)

## 2017-08-30 PROCEDURE — 84702 CHORIONIC GONADOTROPIN TEST: CPT | Performed by: OBSTETRICS & GYNECOLOGY

## 2017-08-30 PROCEDURE — 36415 COLL VENOUS BLD VENIPUNCTURE: CPT | Performed by: OBSTETRICS & GYNECOLOGY

## 2017-08-30 NOTE — MR AVS SNAPSHOT
Visit Information Pascual Pina Personal Médico Departamento Teléfono del Dep. Número de visita 8/30/2017  2:15 PM Martha DO [facility] [phone] Upcoming Health Maintenance Date Due  
 PAP AKA CERVICAL CYTOLOGY 12/23/2007 INFLUENZA AGE 9 TO ADULT 8/1/2017 Alergias  Review Complete El: 8/30/2017 Por: Martha Kidd DO A partir del:  8/30/2017 No Known Allergies Vacunas actuales Rollo Pupa No hay ninguna vacuna archivada. No revisadas esta visita You Were Diagnosed With   
  
 [place] Abnormal human chorionic gonadotropin (hCG)    -  Primary ICD-10-CM: R79.9 ICD-9-CM: 790.99 Partes vitales PS Pulso Peso (percentil de crecimiento) Estado obstétrico Estatus de tabaquísmo 110/74 74 108 lb (49 kg) Pregnant Never Smoker Cummings lista de medicamentos actualizada Aviso  As of 8/30/2017  3:29 PM  
 No se le ha recetado ningún medicamento. Por hacer 08/30/2017 Lab:  BETA-HCG, QT, SERIAL Introducing [facility]! [facility] introduce portal paciente MyChart . Ahora se puede acceder a partes de cummings expediente médico, enviar por correo electrónico la oficina de cummings médico y solicitar renovaciones de medicamentos en línea. En cummings navegador de Internet , Rosanna Salvage a [email or web address] Jose clic en el usuario por Dorian Serrano? Ravinder Dad clic aquí en la sesión Lonzell Brim. Verá la página de registro [place]. Ingrese cummings código de [employer] deepika y sherif aparece a continuación. Usted no tendrá que [employer] código después de linh completado el proceso de registro . Si usted no se inscribe antes de la fecha de caducidad , debe solicitar un nuevo código. · MyChart Código de acceso : [id number] Expires: 11/28/2017  3:29 PM 
 
Ingresa los últimos cuatro dígitos de cummings Número de Seguro Social ( xxxx ) y fecha de nacimiento ( dd / mm / aaaa ) sherif se indica y jose clic en Enviar. Usted será llevado a la siguiente página de registro . Crear un ID MyChart . Esta será zacarias ID de inicio de sesión de MyChart y no puede ser [place] , por lo que pensar en kristel que es Andrew Pion y fácil de recordar . Crear kristel contraseña MyChart . Usted puede cambiar zacarias contraseña en cualquier momento . Ingrese zacarias Password Reset de preguntas y [place] . [place] se puede utilizar en un momento posterior si usted olvida zacarias contraseña. Introduzca zacarias dirección de correo electrónico . Daphnie Lucio recibirá kristel notificación por correo electrónico cuando la nueva información está disponible en MyChart . Radames Healy clic en Registrarse. [place] Never michelle y descargar porciones de zacarias expediente médico. 
Jose clic en el enlace de descarga del menú Resumen para descargar kristel copia portátil de zacarias información médica . Si tiene [employer] , por favor visite la sección de preguntas frecuentes del sitio web MyChart . Recuerde, MyChart NO es que se utilizará para las necesidades urgentes. Para emergencias médicas , llame al 911 . Ahora disponible en zacarias iPhone y Android ! Por favor proporcione duong resumen de la documentación de cuidado a zacarias próximo proveedor. If you have any questions after today's visit, please call [phone].

## 2017-08-30 NOTE — PROGRESS NOTES
[facility]  [id number] [facility], [address] is a 28 y.o. female presents today c/o   Chief Complaint   Patient presents with    ED Follow-up     Hemant Gray     2017 beta 402, 2018 beta 349     Patient presented to ER for vaginal bleeding in pregnancy. Denies pelvic pain. Bleeding persisted, worsened over the past week, passing clots. Now brown spotting. Patient was on injectable birth control from [facility]. Last dose . Monthly dosing. Missed menses in July. Review of Systems:   General ROS: negative for fever, chills, malaise  Endocrine ROS: negative for -  breast tenderness/mass/nipple discharge/galactorrhea, hair pattern changes, skin changes or temperature intolerance. Respiratory ROS: no cough, shortness of breath, or wheezing  Cardiovascular ROS: no chest pain or dyspnea on exertion  Gastrointestinal ROS: no abdominal pain, change in bowel habits, or black or bloody stools, nausea, vomiting  Genito-Urinary ROS: no dysuria, trouble voiding, incontinence or hematuria. No pelvic pain, vaginal dryness  Musculoskeletal ROS: negative  Neurological ROS: no TIA or stroke symptoms  Dermatological ROS: negative    OB History      Para Term  AB Living    1         SAB TAB Ectopic Molar Multiple Live Births                 Gyn hx:  Regular menses since menarche. Past Medical History:   Diagnosis Date    Arthritis      Past Surgical History:   Procedure Laterality Date    HX HERNIA REPAIR      hiatal     Family History   Problem Relation Age of Onset    No Known Problems Mother     No Known Problems Father      Social History     Social History    Marital status: UNKNOWN     Spouse name: N/A    Number of children: N/A    Years of education: N/A     Occupational History    Not on file.      Social History Main Topics    Smoking status: Never Smoker    Smokeless tobacco: Not on file  Alcohol use No    Drug use: No    Sexual activity: Yes     Partners: Male     Birth control/ protection: None     Other Topics Concern    Not on file     Social History Narrative    No narrative on file     No Known Allergies  Prior to Admission medications    Not on File        Objective:     Vitals:    08/30/17 1441   BP: 110/74   Pulse: 74   Weight: 108 lb (49 kg)     There is no height or weight on file to calculate BMI. Physical Exam:  General appearance - well appearing, and in no distress and well hydrated  Mental status - alert, oriented to person, place, and time, normal mood, behavior, speech, dress, motor activity, and thought processes  Respiratory:  Normal respiratory effort, no intercostal retractions or use of accessory muscles. Abdomen - soft, nontender, nondistended, no masses or organomegaly  Pelvic - No inguinal lymphadenopathy, normal urethral meatus and no bladder tenderness. VULVA: normal appearing vulva with no masses, tenderness or lesions, VAGINA: normal appearing vagina with normal color and scant brown discharge, no lesions, PELVIC FLOOR EXAM: no cystocele, rectocele or prolapse noted, CERVIX: normal appearing cervix without discharge or lesions, os closed UTERUS: uterus is normal size, shape, consistency and nontender, mobile, ADNEXA: normal adnexa in size, nontender and no masses  Extremities - No edema. Skin - normal coloration and turgor, no rashes, no suspicious skin lesions noted    I personally reviewed ER encounter significant for:  Rh positive, h/h normal, quant hcg dropped from 402 to 349 in 6 days, neg GC/CT. Pelvic US images and report significant for thickened endometrium without gestational sac. Normal adnexa. Assessment/Plan:   >50% of 30 minute visit spent counseling on       ICD-10-CM ICD-9-CM    1.  Abnormal human chorionic gonadotropin (hCG) R79.9 790.99 BETA-HCG, QT, SERIAL     Telephone  service (Nepali) utilized during this visit. Explained abnormal qt hcg levels, vaginal bleeding and no IUP on US. Advised pregnancy of unknown anatomic location. Possible SAB vs ectopic. Patient currently stable thus will wait for hcg level today. Advised if continues to go down, most likely SAB. Patient aware of potential need for MTX tx should levels start to rise without evidence of IUP. Reasons to go to ER reviewed. Follow up TBD. Plan of care discussed. Patient expressed understanding and agreement.                Signed By:  Juan Miguel Serrato DO     August 30, 2017

## 2017-08-31 NOTE — PROGRESS NOTES
Patient notified vis [facility] interpretor  Beta is decreasing (341), please come in on 09- to repeat labs.

## 2017-09-05 ENCOUNTER — OFFICE VISIT (OUTPATIENT)
Dept: OBGYN CLINIC | Age: 32
End: 2017-09-05

## 2017-09-05 ENCOUNTER — HOSPITAL ENCOUNTER (OUTPATIENT)
Dept: LAB | Age: 32
Discharge: HOME | End: 2017-09-05
Payer: SELF-PAY

## 2017-09-05 LAB — HCG SERPL-ACNC: 149 MIU/ML (ref 0–10)

## 2017-09-05 PROCEDURE — 84702 CHORIONIC GONADOTROPIN TEST: CPT | Performed by: OBSTETRICS & GYNECOLOGY

## 2017-09-05 PROCEDURE — 36415 COLL VENOUS BLD VENIPUNCTURE: CPT | Performed by: OBSTETRICS & GYNECOLOGY

## 2017-09-11 NOTE — PROGRESS NOTES
Patient notified and voices understanding:  hcg/beta trending downward consistent with miscarriage. Will repeat labs weekly until level back to negative range. Patient added to tomorrow schedule.

## 2017-09-12 ENCOUNTER — HOSPITAL ENCOUNTER (OUTPATIENT)
Dept: LAB | Age: 32
Discharge: HOME | End: 2017-09-12
Payer: SELF-PAY

## 2017-09-12 ENCOUNTER — OFFICE VISIT (OUTPATIENT)
Dept: OBGYN CLINIC | Age: 32
End: 2017-09-12

## 2017-09-12 DIAGNOSIS — O03.9 MISCARRIAGE: ICD-10-CM

## 2017-09-12 DIAGNOSIS — O03.9 MISCARRIAGE: Primary | ICD-10-CM

## 2017-09-12 LAB — HCG SERPL-ACNC: 60 MIU/ML (ref 0–10)

## 2017-09-12 PROCEDURE — 36415 COLL VENOUS BLD VENIPUNCTURE: CPT | Performed by: OBSTETRICS & GYNECOLOGY

## 2017-09-12 PROCEDURE — 84702 CHORIONIC GONADOTROPIN TEST: CPT | Performed by: OBSTETRICS & GYNECOLOGY

## 2017-09-13 NOTE — PROGRESS NOTES
Trending down, consistent with miscarriage.   Please call patient and continue weekly hcg until negative

## 2017-09-19 ENCOUNTER — HOSPITAL ENCOUNTER (OUTPATIENT)
Dept: LAB | Age: 32
Discharge: HOME | End: 2017-09-19
Payer: SELF-PAY

## 2017-09-19 ENCOUNTER — OFFICE VISIT (OUTPATIENT)
Dept: OBGYN CLINIC | Age: 32
End: 2017-09-19

## 2017-09-19 DIAGNOSIS — O03.9 MISCARRIAGE: ICD-10-CM

## 2017-09-19 DIAGNOSIS — O03.9 MISCARRIAGE: Primary | ICD-10-CM

## 2017-09-19 LAB — HCG SERPL-ACNC: 25 MIU/ML (ref 0–10)

## 2017-09-19 PROCEDURE — 84702 CHORIONIC GONADOTROPIN TEST: CPT | Performed by: OBSTETRICS & GYNECOLOGY

## 2017-09-19 PROCEDURE — 36415 COLL VENOUS BLD VENIPUNCTURE: CPT | Performed by: OBSTETRICS & GYNECOLOGY

## 2017-09-19 NOTE — PROGRESS NOTES
Patient in for [facility] DIV beta/hcg

## 2017-09-26 ENCOUNTER — OFFICE VISIT (OUTPATIENT)
Dept: OBGYN CLINIC | Age: 32
End: 2017-09-26

## 2017-09-26 ENCOUNTER — HOSPITAL ENCOUNTER (OUTPATIENT)
Dept: LAB | Age: 32
Discharge: HOME | End: 2017-09-26
Payer: SELF-PAY

## 2017-09-26 DIAGNOSIS — O03.9 MISCARRIAGE: Primary | ICD-10-CM

## 2017-09-26 DIAGNOSIS — O03.9 MISCARRIAGE: ICD-10-CM

## 2017-09-26 LAB — HCG SERPL-ACNC: 15 MIU/ML (ref 0–10)

## 2017-09-26 PROCEDURE — 36415 COLL VENOUS BLD VENIPUNCTURE: CPT | Performed by: OBSTETRICS & GYNECOLOGY

## 2017-09-26 PROCEDURE — 84702 CHORIONIC GONADOTROPIN TEST: CPT | Performed by: OBSTETRICS & GYNECOLOGY

## 2017-10-16 ENCOUNTER — OFFICE VISIT (OUTPATIENT)
Dept: OBGYN CLINIC | Age: 32
End: 2017-10-16

## 2017-10-16 DIAGNOSIS — O03.9 MISCARRIAGE: Primary | ICD-10-CM

## 2017-10-17 ENCOUNTER — HOSPITAL ENCOUNTER (OUTPATIENT)
Dept: LAB | Age: 32
Discharge: HOME | End: 2017-10-17
Payer: SELF-PAY

## 2017-10-17 ENCOUNTER — OFFICE VISIT (OUTPATIENT)
Dept: OBGYN CLINIC | Age: 32
End: 2017-10-17

## 2017-10-17 DIAGNOSIS — O03.9 MISCARRIAGE: Primary | ICD-10-CM

## 2017-10-17 DIAGNOSIS — O03.9 MISCARRIAGE: ICD-10-CM

## 2017-10-17 LAB — HCG SERPL-ACNC: <1 MIU/ML (ref 0–10)

## 2017-10-17 PROCEDURE — 84702 CHORIONIC GONADOTROPIN TEST: CPT | Performed by: OBSTETRICS & GYNECOLOGY
